# Patient Record
(demographics unavailable — no encounter records)

---

## 2024-11-14 NOTE — CONSULT LETTER
[FreeTextEntry1] : Dear Dr. CESAR FRAZIER,   I had the pleasure of evaluating your patient, MATTI CONTRERAS. Please see my note below.   Thank you very much for allowing me to participate in the care of this patient. If you have any questions, please do not hesitate to contact me.   Sincerely,   Yoli Amador MD Attending Physician, Pediatric Nephrology Medical Director, [facility] Program

## 2024-12-09 NOTE — PHYSICAL EXAM
[Alert] : alert [Playful] : playful [Normocephalic] : normocephalic [Clear] : right tympanic membrane clear [Clear Rhinorrhea] : clear rhinorrhea [Acute Distress] : no acute distress [Pink Nasal Mucosa] : nasal mucosa not pink

## 2024-12-17 NOTE — HISTORY OF PRESENT ILLNESS
[FreeTextEntry1] :  2 year well  Speech delay, doing early intervention  Can be a picky eater Social: No

## 2025-01-23 NOTE — PHYSICAL EXAM
[Acute Distress] : no acute distress [Alert] : alert [Normocephalic] : normocephalic [Pink Nasal Mucosa] : nasal mucosa not pink [Clear Rhinorrhea] : clear rhinorrhea [Erythematous Oropharynx] : nonerythematous oropharynx [Clear to Auscultation Bilaterally] : clear to auscultation bilaterally [Transmitted Upper Airway Sounds] : no transmitted upper airway sounds [Subcostal Retractions] : no subcostal retractions [Regular Rate and Rhythm] : regular rate and rhythm [Soft] : soft [Tender] : nontender

## 2025-01-23 NOTE — DISCUSSION/SUMMARY
[FreeTextEntry1] : AMIR presents today with middle ear effusion and will be given a wait and see Amoxicillin.

## 2025-03-03 NOTE — HISTORY OF PRESENT ILLNESS
[de-identified] : Five months of clear to yellow nasal drainage. Mom thinks this makes him vomit about every other day; their pediatrician is aware. Chronic mouth breathing worse at night. Known very large adenoid; no response to nasal steroids.  Snoring and witnessed apneas.  He's speech delayed with still only ~8 words. Fluid on the left at the last visit.  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

## 2025-03-03 NOTE — ASSESSMENT
[FreeTextEntry1] : We discussed his now bilateral fluid and scheduled a BMT/A with the plan to cancel the BMT if at a preop in 4-6 wks he still has fluid.

## 2025-03-03 NOTE — PHYSICAL EXAM
[Binocular Microscopic Exam] : Binocular microscopic exam was performed [Normal] : the left tympanic membrane was normal [de-identified] : copious secretions  [de-identified] : 1-2+ [FreeTextEntry8] : obstructing cerumen removed with a loop [FreeTextEntry9] : obstructing cerumen removed with a loop [de-identified] : fluid [de-identified] : fluid

## 2025-03-03 NOTE — DATA REVIEWED
[de-identified] : 3/25: elevated thresholds in at least the better ear - Immitance testing w/ type B AU [de-identified] : 12/24: yeni PERAZA

## 2025-03-21 NOTE — PHYSICAL EXAM
[NL] : no acute distress, alert [Clear Effusion] : clear effusion [Erythema] : erythema [Purulent Effusion] : purulent effusion [Mucoid Discharge] : mucoid discharge [Transmitted Upper Airway Sounds] : transmitted upper airway sounds [Erythematous Oropharynx] : nonerythematous oropharynx

## 2025-03-21 NOTE — HISTORY OF PRESENT ILLNESS
[FreeTextEntry6] : ESTABLISHED PT RUNNY NOSE X A FEW DAYS RUBBING ON BOTH EARS BUT MOSTLY THE RIGHT SIDE NO FEVERS NO CHANGE IN ENERGY HAS HAD ONE EAR INFECTION IN THE PAST IS SCHEDULED FOR ADENOIDECTOMY AND MYRINGOTOMY IN MAY DUE TO PERSISTENT FLUID.  CURRENTLY ON  CYTRA DAILY FOR RTA, FOLLOWED BY NEPHROLOGY

## 2025-04-19 NOTE — HISTORY OF PRESENT ILLNESS
[FreeTextEntry6] : MATTI presents today with ear tugging and fever. He will have ear tubes later next month

## 2025-04-19 NOTE — PHYSICAL EXAM
[Clear] : left tympanic membrane not clear [Bulging] : bulging [Erythema] : erythema [Purulent Effusion] : purulent effusion

## 2025-04-19 NOTE — DISCUSSION/SUMMARY
[FreeTextEntry1] : AMIR presents today with otitis media and will be treated with Augmentin for 10 days

## 2025-05-05 NOTE — ASSESSMENT
[FreeTextEntry1] : We discussed his resolved fluid and lack of ongoing witnessed apneas (Mom has been carefully following his sleep); accordingly, we cancelled surgery for both his ears and adenoid. We scheduled a 2-audiologist [facility] at [address] for three months from now and may consider a sedated ABR for ongoing concerns about hearing loss and Mom will bring him back to us with further concerns about ear infections.

## 2025-05-05 NOTE — PHYSICAL EXAM
[de-identified] : 1-2+ [Binocular Microscopic Exam] : Binocular microscopic exam was performed [Normal] : the left middle ear was normal [FreeTextEntry8] : obstructing cerumen removed with a loop [FreeTextEntry9] : obstructing cerumen removed with a loop

## 2025-05-05 NOTE — DATA REVIEWED
[de-identified] : 3/25: elevated thresholds in at least the better ear- Immitance testing w/ type B AU 5/25: elevated responses in the SF but inattention - Immitance testing w/ type A AD, C AS [de-identified] : 12/24: yeni PERAZA

## 2025-05-05 NOTE — REASON FOR VISIT
[Subsequent Evaluation] : a subsequent evaluation for [FreeTextEntry2] : follow up speech delay & snoring

## 2025-05-05 NOTE — HISTORY OF PRESENT ILLNESS
[de-identified] : He still has some clear nasal drainage. Chronic mouth breathing worse at night has improved significantly on the nasal steroids.  Some ongoing snoring but Mom no longer witnesses apneas.  He's speech delayed with still only ~8 words. We've been following intermittent fluid which was on both sides at his last visit; Mom says he was recently treated for an ear infection.  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

## 2025-06-21 NOTE — DISCUSSION/SUMMARY
[FreeTextEntry1] : Rapid strep test is negatie so sending throat culture specimen to lab. PO clear fluids such as Gatorade, Pediatlyte, ice pops etcetera. Offer regular diet but give soft/smooth food items since his throat is sore. F/U throat culture when available. Will give antibiotics IF the culture comes back positive. For F/U visit if not resolving in the next 5-7 days, or sooner if worsening. Ensure adequate hydration.

## 2025-06-21 NOTE — HISTORY OF PRESENT ILLNESS
[de-identified] : Sleping a lot, vomited once [FreeTextEntry6] : Since this morning he is just wanting to sleep since he woke up. Last night he slept restlessly, was waking frequently. So far this morning he vomited once. No diarrhea. Urine seems normal. He skipped dinner last night. No one else is sick at home.  He did not even sip anything yet today. He nibbled a tiny bit of a croissant. This office is his medical home.

## 2025-06-21 NOTE — PHYSICAL EXAM
[Normocephalic] : normocephalic [EOMI] : grossly EOMI [Conjuctival Injection] : no conjunctival injection [Discharge] : no discharge [Cerumen in canal] : cerumen in canal [Clear] : right tympanic membrane clear [Erythematous Oropharynx] : erythematous oropharynx [Enlarged Tonsils] : tonsils not enlarged [Vesicles] : no vesicles [Exudate] : no exudate [Ulcerative Lesions] : no ulcerative lesions [Palate petechiae] : palate without petechiae [Supple] : supple [Symmetric Chest Wall] : symmetric chest wall [Clear to Auscultation Bilaterally] : clear to auscultation bilaterally [Hepatosplenomegaly] : no hepatosplenomegaly [NL] : normotonic [FreeTextEntry1] : Afebrile, resting comfortably in mom's arms, in no acute distress [FreeTextEntry3] : cerumen in left ear canal blocking the view of his right TM. [de-identified] : no rash
